# Patient Record
Sex: MALE | URBAN - METROPOLITAN AREA
[De-identification: names, ages, dates, MRNs, and addresses within clinical notes are randomized per-mention and may not be internally consistent; named-entity substitution may affect disease eponyms.]

---

## 2024-02-01 ENCOUNTER — APPOINTMENT (OUTPATIENT)
Dept: URBAN - METROPOLITAN AREA CLINIC 211 | Age: 34
Setting detail: DERMATOLOGY
End: 2024-02-02

## 2024-02-01 DIAGNOSIS — L21.8 OTHER SEBORRHEIC DERMATITIS: ICD-10-CM

## 2024-02-01 DIAGNOSIS — L65.9 NONSCARRING HAIR LOSS, UNSPECIFIED: ICD-10-CM

## 2024-02-01 PROCEDURE — OTHER TREATMENT REGIMEN: OTHER

## 2024-02-01 PROCEDURE — OTHER COUNSELING: OTHER

## 2024-02-01 PROCEDURE — OTHER MIPS QUALITY: OTHER

## 2024-02-01 PROCEDURE — OTHER PRESCRIPTION: OTHER

## 2024-02-01 PROCEDURE — OTHER PRESCRIPTION MEDICATION MANAGEMENT: OTHER

## 2024-02-01 PROCEDURE — 99203 OFFICE O/P NEW LOW 30 MIN: CPT

## 2024-02-01 RX ORDER — KETOCONAZOLE 20 MG/ML
SHAMPOO, SUSPENSION TOPICAL
Qty: 120 | Refills: 5 | Status: ERX | COMMUNITY
Start: 2024-02-01

## 2024-02-01 RX ORDER — CLOBETASOL PROPIONATE 0.5 MG/ML
SOLUTION TOPICAL
Qty: 50 | Refills: 1 | Status: ERX | COMMUNITY
Start: 2024-02-01

## 2024-02-01 ASSESSMENT — LOCATION DETAILED DESCRIPTION DERM
LOCATION DETAILED: LEFT SUPERIOR PARIETAL SCALP
LOCATION DETAILED: MID-FRONTAL SCALP

## 2024-02-01 ASSESSMENT — LOCATION ZONE DERM: LOCATION ZONE: SCALP

## 2024-02-01 ASSESSMENT — LOCATION SIMPLE DESCRIPTION DERM
LOCATION SIMPLE: SCALP
LOCATION SIMPLE: ANTERIOR SCALP

## 2024-02-01 NOTE — HPI: HAIR LOSS
Previous Labs: No
How Did The Hair Loss Occur?: gradual in onset
How Severe Is Your Hair Loss?: mild
Additional History: Patient presents in the office as a new patient. He states he use to have very thick hair and within the last 6 months or so he has noticed a lot of shedding in the shower, he mentioned that he does wash his hair daily.  He states he has not noticed any balding areas. He has not been sick recently, but mentioned he had COVID at the end of 2022- early 2023.

## 2024-02-01 NOTE — PROCEDURE: PRESCRIPTION MEDICATION MANAGEMENT
Detail Level: Zone
Plan: 1. Initiate ketoconazole 2 % shampoo. Lather scalp allowing to sit for 3-5 minutes then rinse. Can use daily prn flares (local)\\n2. Initiate clobetasol 0.05 % scalp solution. Apply to affected areas on scalp twice daily up to 2 -4 weeks as needed for itching. (Local)
Render In Strict Bullet Format?: No

## 2024-02-01 NOTE — PROCEDURE: TREATMENT REGIMEN
Plan: Discussed possibility of some hairloss secondary to seborrheic dermatitis.  Also discussed androgenetic alopecia, however, he does not have a visible patterned loss today.  If not seeing a decrease in shedding after seb derm has improved, can consider Rogaine OTC.
Detail Level: Zone